# Patient Record
Sex: FEMALE | Race: WHITE | Employment: UNEMPLOYED | URBAN - METROPOLITAN AREA
[De-identification: names, ages, dates, MRNs, and addresses within clinical notes are randomized per-mention and may not be internally consistent; named-entity substitution may affect disease eponyms.]

---

## 2019-07-22 ENCOUNTER — TELEPHONE (OUTPATIENT)
Dept: DERMATOLOGY | Facility: AMBULATORY SURGERY CENTER | Age: 44
End: 2019-07-22

## 2019-07-29 NOTE — TELEPHONE ENCOUNTER
Mohs Pre-Op Assessment    Patient Appointment Date: 08/12/19 @5279    Skye Maravilla, 37 y.o., female      does confirm site: left ulnar dorsal hand  Brief description of tumor: BCC  does ID site. (Can they still visibly see the site)  does not have Hepatitis C   does not have HIV (If YES, set up consult appointment)    Allergies: Allergies not on file    does not have an Electrical Implanted Device (Pacemaker, AICD, brain stimulator, etc.)    does not need antibiotics  If yes, antibiotic used:NONE; and needs it because NONE (valve replacement, etc.)  Can patient get antibiotic from primary care provider NO    is not taking NSAIDs  If yes, is taking NONE, and was asked to d/c 2 days prior to surgery and informed to resume taking 2 days after surgery. Patient can switch to a Tylenol based medication. is not taking aspirin  If yes, is taking because of NONE (i.e. heart attack, stroke, TIA, bypass surgery, etc.)    is not taking Garlic  is not taking Ginkgo  is not taking Ginseng  is not taking Fish oils  is not taking Vit E    does not take a blood thinner(i.e. Coumadin/Warfarin, Plavix, Brilinta, Pradaxa, Xarelto, Effient, Eliquis, Savaysa)  If taking Coumadin needs to have PT/INR drawn and faxed results within a week of surgery    does not have a blood disorder (CLL, AML, PV)  is not on Meds for blood disorder, pt on:  is not on Chemo for blood disorder    has not had a organ transplant  has not had a bone marrow transplant      Pre operative assessment questions asked to patient. Patient has a general understanding of the procedure, and has been versed that there will be local anesthesia used in the procedure and that She will be ok to drive themselves to and from the appointment. Patient has been notified to arrive 15-20 minutes early and they may eat or drink before arriving.

## 2019-08-12 ENCOUNTER — OFFICE VISIT (OUTPATIENT)
Dept: DERMATOLOGY | Facility: AMBULATORY SURGERY CENTER | Age: 44
End: 2019-08-12

## 2019-08-12 VITALS
OXYGEN SATURATION: 99 % | HEART RATE: 46 BPM | BODY MASS INDEX: 22.22 KG/M2 | RESPIRATION RATE: 12 BRPM | DIASTOLIC BLOOD PRESSURE: 82 MMHG | HEIGHT: 69 IN | SYSTOLIC BLOOD PRESSURE: 130 MMHG | WEIGHT: 150 LBS

## 2019-08-12 DIAGNOSIS — C44.619 BASAL CELL CARCINOMA OF LEFT HAND: Primary | ICD-10-CM

## 2019-08-12 RX ORDER — LEVOTHYROXINE SODIUM 100 UG/1
100 TABLET ORAL EVERY OTHER DAY
Refills: 0 | COMMUNITY
Start: 2019-08-05

## 2019-08-12 RX ORDER — CEPHALEXIN 500 MG/1
500 CAPSULE ORAL 3 TIMES DAILY
Qty: 21 CAP | Refills: 0 | Status: SHIPPED | OUTPATIENT
Start: 2019-08-12 | End: 2019-08-19

## 2019-08-12 RX ORDER — LEVOTHYROXINE SODIUM 88 UG/1
88 TABLET ORAL EVERY OTHER DAY
COMMUNITY

## 2019-08-12 NOTE — PROGRESS NOTES
Tita Rodriguez is a 37 y.o. female who is seen post-Mohs surgery to evaluate:  Chief Complaint:  Wound resulting from surgical extirpation of a skin cancer. HPI: Biopsy-proven recurrent basal cell carcinoma of the left ulnar dorsal hand s/p 2 stages of Mohs surgery resulting in tumor-free margins. The size of the defect is 1.9 x 1.5 cm. The site is bleeding and is not painful. The tumor was removed on 8/12/2019. Outpatient Encounter Medications as of 8/12/2019   Medication Sig Dispense Refill    levothyroxine (SYNTHROID) 88 mcg tablet Take 88 mcg by mouth every other day.  levothyroxine (SYNTHROID) 100 mcg tablet Take 100 mcg by mouth every other day.  0    cephALEXin (KEFLEX) 500 mg capsule Take 1 Cap by mouth three (3) times daily for 7 days. 21 Cap 0     No facility-administered encounter medications on file as of 8/12/2019. No Known Allergies    Social History     Tobacco Use   Smoking Status Former Smoker   Smokeless Tobacco Never Used       Physical Exam   Constitutional: She is oriented to person, place, and time and well-developed, well-nourished, and in no distress. HENT:   Head: Normocephalic. Eyes: EOM are normal.   Pulmonary/Chest: Effort normal.   Neurological: She is alert and oriented to person, place, and time. Skin:   On the left ulnar dorsal hand there is a 1.9 cm bleeding surgical defect extending to subcutaneous fat. There is insufficient laxity when the patient is making a fist to perform a complete primary closure. Evaluation and Management:  There are symptomatic surgical defects as noted above. Treatment options discussed with the patient include second intent healing, primary closure, adjacent tissue transfer (flap), and skin graft (full or split thickness) without cartilage grafting.   Following evaluation of the defect(s) and discussion with the patient regarding risks and benefits of the various options, the plan to repair the defect with a(n) Burow's graft was chosen. This repair was chosen in order to avoid excessive wound tension. The wound management options of second intent healing, layered closure, local flap, or full thickness skin graft were discussed. Ms. Deb Cantu understands the aims, risks, alternatives, and possible complications and elects to proceed with a full thickness skin graft. Ms. Deb Cantu was placed in a supine position on the operating table in the Mohs surgery procedure room. The area was prepped and draped in the standard manner. A template of donor skin the size of the wound was drawn at the proximal dorsal hand skin. 1% lidocaine with epinephrine 1:100,000 was used to anesthetize the donor area. The graft was removed from the donor area and defatted. Hemostasis was obtained with spot electrocoagulation. The margins of the donor site were undermined and then the site was sutured with 4-0 vicryl and 5-0 ethilon to approximate the skin edges. The defatted graft was placed on the recipient site, trimmed to size and anchored in place with a running 5-0 ethilon suture around the margin. A bolster was applied. The size of the graft was 1.5 x 0.8 cm. A pressure dressing utilizing Petrolatum ointment, Telfa, gauze and Coverroll was placed on the donor site. Wound care instructions (written and/or verbal) and a follow up appointment were given to the patient before discharge. Ms. Deb Cantu was discharged in good condition. Follow-up and Dispositions    · Return in about 1 week (around 8/19/2019) for suture removal .         Emma Mancini MD    [address]   OFFICE PROCEDURE PROGRESS NOTE   Chart reviewed for the following:   Mary Mcdowell MD have reviewed the History, Physical and updated the Allergic reactions for [facility].     TIME OUT performed immediately prior to start of procedure:   Mary Mcdowell MD, have performed the following reviews on Lena Gloria   prior to the start of the procedure:     * Patient was identified by name and date of birth   * Agreement on procedure being performed was verified   * Risks and Benefits explained to the patient   * Procedure site verified and marked as necessary   * Patient was positioned for comfort   * Consent was signed and verified     Time: 1:15 PM  Date of procedure: 8/12/2019  Procedure performed by:  Richard Sewell MD  Provider assisted by: Sabrina Hyde LPN  Patient assisted by: self   How tolerated by patient: tolerated the procedure well with no complications   Comments: none

## 2019-08-12 NOTE — PATIENT INSTRUCTIONS
WOUND CARE INSTRUCTIONS     1. Keep the dressing clean and dry and do not remove for until you come back to us in one week. 2. Then change the dressing once a day as follows:  a. Wash hands before and after each dressing change. b. Remove dressing and wash site gently with mild soap and water, rinse, and pat dry.  c. Apply liberal amounts of an ointment (Petroleum jelly or Aquaphor). d. Apply a non-stick (Telfa) dressing or Band-Aid to cover the wound. 3. Watch for:  BLEEDING: A small amount of drainage may occur. If bleeding occurs, elevate and rest the surgery site. Apply gauze and steady pressure for 20 minutes. If bleeding continues repeat pressure once more. If bleeding still does not stop, call this office. If after hours, call Dr. Aden Castillo at [phone]. INFECTION: Signs of infection include increased redness, pain, warmth, drainage of pus, and fever. If this occurs, please call this office. 4. Special Instructions (follow any that are checked):  · [x] You have stitches that DO need to be removed. · [] Avoid bending at the waist and heavy lifting for two days. · [] Sleep with your head elevated for the next two nights. · [] Rest the surgery site and keep it elevated as much as possible for two days. · [x] You may apply an ice-pack for 10-15 minutes every waking hour for the rest of the day. · [] Eat a soft diet and avoid hot food and hot drinks for the rest of the day. · [] Other instructions: Follow up as directed. Take Tylenol or Ibuprofen for pain as needed. Once the site is healed with no remaining bandages or open areas, protect your surgical site and scar from the sun, as this area will be more sensitive. Use a broad spectrum sunscreen SPF 30 or higher daily, and a chemical free product (one containing zinc oxide or titanium dioxide) is a good choice if the area is sensitive.     You may begin to gently massage the surgical site in 3 weeks, rubbing in a circular motion along the scar. This can help reduce swelling and thickness of a scar. If you have any questions or concerns, please call our office Monday through Friday at [phone]. If after hours, please call Dr. Cabrera Hidden at [phone].

## 2019-08-12 NOTE — PROGRESS NOTES
Progress note for Mohs surgery patient:    Chief Complaint:  basal cell carcinoma of the Left ulnar dorsal hand    HPI:  Kelby Dance is a 37y.o. year old female referred by  Jimmy Pinzon MD for Mohs surgery to treat the following lesion:  Lesion Info  Location: Left ulnar dorsal hand  Size: 1.4 x 1.2 cm  Type: basal  Duration: 2 years  Path Lab: [facility]  Path #: [id number]  Prior Treatment: Curettage     Symptoms of the lesion include recurrence. ROS:  Londell Lundborg is feeling well and in their usual state of health today. She is not in pain. She does not have any other skin concerns. Exam:  Londell Lundborg is an awake, alert, oriented, well-appearing female in no distress. The left dorsal hand was examined. Findings are:  On the left dorsal hand there is a well-healed surgical scar with a pink scaly patch emerging from one edge. A/P:  basal cell carcinoma of the Left ulnar dorsal hand. The diagnosis was reviewed. The Mohs surgery procedure was reviewed. Indications, risks, and options were discussed with Ms. Raegan Vega preoperatively. Risks including, but not limited to: pain, bleeding, infection, tumor recurrence, scarring and damage to motor and/or sensory nerves, were discussed. Ms. Raegan Vega chose Mohs surgery. Ms. Raegan Vega was an acceptable surgery candidate. I performed Mohs surgery using standard technique after verbal and written consent were obtained. The lesion was identified and confirmed with the patient and photograph, if available. The surgical site was marked with gentian violet, prepped, draped and anesthetized in standard fashion. The tumor was debulked by curettage and orientation hashes were placed. The tumor and any associated scar was excised using beveled incision. Hemostasis was achieved, the site was bandaged, and the tissue was transported to the Mohs lab.   While maintaining anatomic orientation the tissue was divided, if needed, and marked with colored inks that were noted on the corresponding Mohs map. The tissue was prepared by Mohs en-face technique for fresh frozen section analysis. The resulting slides were examined for residual tumor, scar and other concerns, all of which were marked on the corresponding Mohs map, if present. The Mohs map was used to guide subsequent stages of surgery, if necessary, and the above process was repeated until a tumor-free plane was achieved. Once the tumor was cleared the map was marked as such and signed. Dr. Aden Castillo acted as surgeon and pathologist for the entire case, performing all stages of the surgical excision as well as examination and interpretation of the histologic slides. See table below for details regarding the surgical case. 2 stage(s) were required to reach a tumor-free plane, resulting in a 1.9 x 1.5 cm defect extending to the Subcutaneous. There were not complications. Jetta Primrose will follow up as needed in the postoperative period. Regular skin examinations will be with  Fredo Murphy MD.    See repair note for reconstructive details.             Left ulnar dorsal hand  Mohs Lesion Operative Report  Date: 08/12/19  Room: Procedure room 1 and 2  Indications: Site, Size, Recurrent  Pre-op Meds: none  Pre-op BP: 128/62  1st Assistant: MARIA LUISA Victoria RN  Stage #: 2  Stage 1 Sections: 1  Stage 1 # Pos: 1  Stage 2 Sections: 1  Stage 2 # POS: 0  Perineural Involvement: No  Lymphadenopathy: No  Defect Size: 1.9 x 1.5 cm  Depth: Subcutaneous  Wound Mgt: Burow's graft  Donor Site: Adjacent skin  Suture: Buried, Surface  Buried details: 4.0 vicryl  Surface Details: 5.0 nylon  Undermining: SubQ  Estimated Blood Loss: 0.2 mL  Hemostasis: Electrosurgery, Suture  Anesthesia: 1% Lidocaine w/1:100,000 epi  1% Lidocaine: 9 cc  Complications: None  Dressing: Pressure, telfa, bolster, vaseline  Post-op BP: 130/82  Post-op Pulse: 46  Pos-op Meds: Keflex 500   W/C Instructions: Written, Verbal  Follow-up: follow up in one week for suture and bolster removal      [facility]   OFFICE PROCEDURE PROGRESS NOTE   Chart reviewed for the following:   IGloria MD have reviewed the History, Physical and updated the Allergic reactions for [facility]. TIME OUT performed immediately prior to start of procedure:   Khadra Pantoja MD, have performed the following reviews on [facility]   prior to the start of the procedure:     * Patient was identified by name and date of birth   * Agreement on procedure being performed was verified   * Risks and Benefits explained to the patient   * Procedure site verified and marked as necessary   * Patient was positioned for comfort   * Consent was signed and verified     Time: 1:15 PM  Date of procedure: 8/12/2019  Procedure performed by:  Gloria Padgett MD  Provider assisted by: Judyth Homans, LPN Dorita Sour, RN  Patient assisted by: self   How tolerated by patient: tolerated the procedure well with no complications   Comments: none

## 2019-08-12 NOTE — LETTER
8/12/2019 6:18 PM 
 
Patient:  Ary James YOB: 1975 Date of Visit: 8/12/2019 Dear MD Conner Felton [phone] [id number] VIA Facsimile: [phone] Thank you for referring Ary James to me for evaluation/treatment. Below are the relevant portions of my assessment and plan of care. Ms. Destiney Hansen presented today for Mohs surgery to treat a biopsy-proven recurrent basal cell carcinoma of the left ulnar dorsal hand. 2 stage(s) of Mohs surgery were required to achieve tumor free margins. I repaired the defect with a(n) Burow's graft. She tolerated the procedure well. Please see the attached procedure note(s) for additional details. Ms. Destiney Hansen will return to me for suture removal and/or wound checks at an appropriate interval and I will follow-up with her regarding any issues arising from or relating to this surgery. I will otherwise defer any additional dermatologic care back to you. If you have questions, please do not hesitate to call me. I look forward to following Ms. Destiney Hansen along with you. Sincerely, David Lester MD 
[phone] (cell)

## 2019-08-19 ENCOUNTER — OFFICE VISIT (OUTPATIENT)
Dept: DERMATOLOGY | Facility: AMBULATORY SURGERY CENTER | Age: 44
End: 2019-08-19

## 2019-08-19 DIAGNOSIS — C44.619 BASAL CELL CARCINOMA OF LEFT HAND: Primary | ICD-10-CM

## 2019-08-19 NOTE — PROGRESS NOTES
Wound check/suture removal:    Chief complaint: wound check. HPI: Dorothy Chapman presents for wound check following Mohs surgery to treat a biopsy-proven recurrent basal cell carcinoma on the left dorsal hand repaired with a Burow's graft performed 1 week ago. Exam: The surgical site was examined. There is not evidence of infection. There is erythema. There is not edema. A/P:  Wound check. The surgical site is healing well. Additional care was reviewed including liberal application of Vaseline several times daily and gentle scar massage starting at 3 weeks postop. Follow up will be in 2 weeks.

## 2019-09-03 ENCOUNTER — OFFICE VISIT (OUTPATIENT)
Dept: DERMATOLOGY | Facility: AMBULATORY SURGERY CENTER | Age: 44
End: 2019-09-03

## 2019-09-03 DIAGNOSIS — C44.619 BASAL CELL CARCINOMA OF LEFT HAND: Primary | ICD-10-CM

## 2019-09-03 NOTE — PROGRESS NOTES
Wound check/suture removal:    Chief complaint: wound check. HPI: David Phelps presents for wound check following Mohs surgery to treat a biopsy-proven recurrent basal cell carcinoma of the left dorsal hand repaired with a skin graft performed about 3 weeks ago. Exam: The surgical site was examined. There is not evidence of infection. There is erythema. There is not edema. There are 2 small suture reactions. A/P:  Wound check. The surgical site is healing well. Additional care was reviewed including liberal application of Vaseline several times daily and gentle scar massage starting at 3 weeks postop. Follow up will be in 2 months.

## 2019-11-04 ENCOUNTER — OFFICE VISIT (OUTPATIENT)
Dept: DERMATOLOGY | Facility: AMBULATORY SURGERY CENTER | Age: 44
End: 2019-11-04

## 2019-11-04 DIAGNOSIS — C44.619 BASAL CELL CARCINOMA OF LEFT HAND: Primary | ICD-10-CM

## 2019-11-04 NOTE — PROGRESS NOTES
Wound check/suture removal:    Chief complaint: wound check. HPI: Christina Perkins presents for wound check following Mohs surgery to treat a biopsy-proven basal cell carcinoma of the left dorsal hand repaired with a Burow's graft performed about 3 months ago. Exam: The surgical site was examined. There is not evidence of infection. There is not erythema. There is not edema. A/P:  Wound check. The surgical site is healing well. Additional care was reviewed including liberal application of Vaseline several times daily and gentle scar massage starting at 3 weeks postop. Follow up will be as needed.
